# Patient Record
Sex: FEMALE | Race: WHITE
[De-identification: names, ages, dates, MRNs, and addresses within clinical notes are randomized per-mention and may not be internally consistent; named-entity substitution may affect disease eponyms.]

---

## 2021-12-13 ENCOUNTER — HOSPITAL ENCOUNTER (EMERGENCY)
Dept: HOSPITAL 33 - ED | Age: 56
Discharge: HOME | End: 2021-12-13
Payer: MEDICAID

## 2021-12-13 VITALS — DIASTOLIC BLOOD PRESSURE: 80 MMHG | HEART RATE: 83 BPM | SYSTOLIC BLOOD PRESSURE: 138 MMHG

## 2021-12-13 VITALS — OXYGEN SATURATION: 100 %

## 2021-12-13 DIAGNOSIS — Z72.0: ICD-10-CM

## 2021-12-13 DIAGNOSIS — N18.9: ICD-10-CM

## 2021-12-13 DIAGNOSIS — I71.4: Primary | ICD-10-CM

## 2021-12-13 DIAGNOSIS — I12.9: ICD-10-CM

## 2021-12-13 DIAGNOSIS — E11.22: ICD-10-CM

## 2021-12-13 DIAGNOSIS — R11.2: ICD-10-CM

## 2021-12-13 DIAGNOSIS — Z79.84: ICD-10-CM

## 2021-12-13 LAB
ALBUMIN SERPL-MCNC: 4.5 G/DL (ref 3.5–5)
ALP SERPL-CCNC: 83 U/L (ref 38–126)
ALT SERPL-CCNC: 13 U/L (ref 0–35)
ANION GAP SERPL CALC-SCNC: 18.9 MEQ/L (ref 5–15)
AST SERPL QL: 19 U/L (ref 14–36)
BASOPHILS # BLD AUTO: 0.03 10*3/UL (ref 0–0.4)
BASOPHILS NFR BLD AUTO: 0.2 % (ref 0–0.4)
BILIRUB BLD-MCNC: 0.5 MG/DL (ref 0.2–1.3)
BUN SERPL-MCNC: 35 MG/DL (ref 7–17)
CALCIUM SPEC-MCNC: 9.5 MG/DL (ref 8.4–10.2)
CHLORIDE SERPL-SCNC: 96 MMOL/L (ref 98–107)
CO2 SERPL-SCNC: 23 MMOL/L (ref 22–30)
CREAT SERPL-MCNC: 1.31 MG/DL (ref 0.52–1.04)
EOSINOPHIL # BLD AUTO: 0.08 10*3/UL (ref 0–0.5)
GFR SERPLBLD BASED ON 1.73 SQ M-ARVRAT: 44.3 ML/MIN
GLUCOSE SERPL-MCNC: 192 MG/DL (ref 74–106)
GLUCOSE UR-MCNC: >=500 MG/DL
HCT VFR BLD AUTO: 39.6 % (ref 35–47)
HGB BLD-MCNC: 13.4 GM/DL (ref 12–16)
LYMPHOCYTES # SPEC AUTO: 3.64 10*3/UL (ref 1–4.6)
MCH RBC QN AUTO: 28.3 PG (ref 26–32)
MCHC RBC AUTO-ENTMCNC: 33.8 G/DL (ref 32–36)
MONOCYTES # BLD AUTO: 0.81 10*3/UL (ref 0–1.3)
PLATELET # BLD AUTO: 282 K/MM3 (ref 150–450)
POTASSIUM SERPLBLD-SCNC: 4.4 MMOL/L (ref 3.5–5.1)
PROT SERPL-MCNC: 7.7 G/DL (ref 6.3–8.2)
PROT UR STRIP-MCNC: 100 MG/DL
RBC # BLD AUTO: 4.74 M/MM3 (ref 4.1–5.4)
RBC #/AREA URNS HPF: (no result) /HPF (ref 0–2)
SODIUM SERPL-SCNC: 133 MMOL/L (ref 137–145)
WBC # BLD AUTO: 12.3 K/MM3 (ref 4–10.5)
WBC #/AREA URNS HPF: (no result) /HPF (ref 0–5)

## 2021-12-13 PROCEDURE — 81001 URINALYSIS AUTO W/SCOPE: CPT

## 2021-12-13 PROCEDURE — 74176 CT ABD & PELVIS W/O CONTRAST: CPT

## 2021-12-13 PROCEDURE — 99284 EMERGENCY DEPT VISIT MOD MDM: CPT

## 2021-12-13 PROCEDURE — 83605 ASSAY OF LACTIC ACID: CPT

## 2021-12-13 PROCEDURE — 80053 COMPREHEN METABOLIC PANEL: CPT

## 2021-12-13 PROCEDURE — 84484 ASSAY OF TROPONIN QUANT: CPT

## 2021-12-13 PROCEDURE — 85025 COMPLETE CBC W/AUTO DIFF WBC: CPT

## 2021-12-13 PROCEDURE — U0003 INFECTIOUS AGENT DETECTION BY NUCLEIC ACID (DNA OR RNA); SEVERE ACUTE RESPIRATORY SYNDROME CORONAVIRUS 2 (SARS-COV-2) (CORONAVIRUS DISEASE [COVID-19]), AMPLIFIED PROBE TECHNIQUE, MAKING USE OF HIGH THROUGHPUT TECHNOLOGIES AS DESCRIBED BY CMS-2020-01-R: HCPCS

## 2021-12-13 PROCEDURE — 96374 THER/PROPH/DIAG INJ IV PUSH: CPT

## 2021-12-13 PROCEDURE — 96375 TX/PRO/DX INJ NEW DRUG ADDON: CPT

## 2021-12-13 PROCEDURE — 36000 PLACE NEEDLE IN VEIN: CPT

## 2021-12-13 PROCEDURE — 96360 HYDRATION IV INFUSION INIT: CPT

## 2021-12-13 PROCEDURE — 36415 COLL VENOUS BLD VENIPUNCTURE: CPT

## 2021-12-13 PROCEDURE — 93005 ELECTROCARDIOGRAM TRACING: CPT

## 2021-12-13 NOTE — XRAY
Indication: Right back pain.  History kidney stones.



Multiple contiguous axial images obtained through the abdomen and pelvis

without contrast.



Comparison: None



Lung bases are clear.  Heart is not enlarged.



No renal calculus or evidence for obstructive uropathy in either  system.

There are mild scattered renal artery calcifications right greater than left.

Noncontrasted stomach and bowel loops nonobstructed.  Normal appendix.

Radiopacity in the ascending and transverse colon presumed ingested

medication/bismuth or barium.  Previous cholecystectomy.  No free fluid/air.



Remaining liver, pancreas, spleen, adrenal glands, kidneys, ureters, and

bladder are unremarkable for noncontrast exam.  Moderate aortoiliac

calcifications with distal 2.3 x 2.5 cm aortic aneurysm.



Osseous structures intact with minimal/mild degenerative changes throughout

the spine.  Intact ventral mesh graft.



Impression:

1.  No renal calculus or evidence for obstructive uropathy.

2.  Moderate arteriosclerotic calcifications including both renal arteries.

Incidental minimal distal AAA.

3.  Remaining CT abdomen/pelvis without contrast exam is negative.

## 2021-12-13 NOTE — ERPHSYRPT
- History of Present Illness


Historian: patient


Exam Limitations: no limitations


Patient Subjective Stated Complaint: PT states "I have been vomiting since 

friday and the right side of my lower back hurts.".  Pt sister states "She has 

chronic UTI, she just had her left caritid cleaned out and she needs her right 

carotid and both femorals done as well.  We just moved her here from [place].  She is

diabetic, has abdomen issues as well."


Triage Nursing Assessment: Pt presented alert and oriented X 3, skin wpd Pt 

ambulates with an upright steady gait, able to speak in clear full sentences pt 

in no apparent respiratory distress.


Physician History: 





55 yo wf w N/V x 3 days. Pt has mild diarrhea but denies any real abdominal 

pain. Chest pain/dyspnea are also denied. Pt has a mild cough which she states 

is chronic. Hematemesis/melena/hematochezia are also denied. Pt has mild R flank

pain and mild dysuria wo hematuria/frequency. Pt is fully vaccinated and fever 

is denied. Pt smokes 1ppd and ETOH use denied.


Timing/Duration: other (3 days)


Quality: other (No real abdominal pain)


Pain Radiation: no radiation


Severity of Pain-Max: none


Severity of Pain-Current: none


Modifying Factors: Improves With: nothing, urinating


Associated Symptoms: back, diarrhea (Mild), nausea, vomiting


Previous symptoms: no prior history


Allergies/Adverse Reactions: 








Cephalosporins Allergy (Intermediate, Verified 12/13/21 10:24)


   


Tetracyclines Allergy (Intermediate, Verified 12/13/21 10:24)


   


avalox Allergy (Intermediate, Uncoded 12/13/21 10:24)


   





Home Medications: 








Atorvastatin Calcium [Lipitor 40Mg] 40 mg PO DAILY 12/13/21 [History]


Gabapentin [Neurontin] 300 mg PO TID 12/13/21 [History]


Iron 18 mg PO DAILY 12/13/21 [History]


Lisinopril 5 mg*** [Zestril 5 MG***] 5 mg PO DAILY 12/13/21 [History]


Metformin HCl [Metformin ER Gastric] 500 mg PO BID 12/13/21 [History]





Hx Tetanus, Diphtheria Vaccination/Date Given: No


Hx Influenza Vaccination/Date Given: Yes


Hx Pneumococcal Vaccination/Date Given: Yes


Immunizations Up to Date: Yes





Travel Risk





- International Travel


Have you traveled outside of the country in past 3 weeks: No





- Coronavirus Screening


Are you exhibiting any of the following symptoms?: No


Close contact with a COVID-19 positive Pt in past 14-21 Days: No





- Vaccine Status


Have you recieved a Covid-19 vaccination: Yes


: Moderna





- Vaccination Dates


Date of 2cond Vaccination (if applicable): 10/2021





- Review of Systems


Constitutional: No Symptoms


Eyes: No Symptoms


Ears, Nose, & Throat: No Symptoms


Respiratory: No Symptoms


Cardiac: No Symptoms


Abdominal/Gastrointestinal: No Symptoms, Nausea, Vomiting, Diarrhea, No 

Abdominal Pain


Genitourinary Symptoms: No Symptoms, Dysuria (Mild), Flank Pain


Musculoskeletal: No Symptoms


Skin: No Symptoms


Neurological: No Symptoms


Psychological: No Symptoms


Endocrine: No Symptoms


Hematologic/Lymphatic: No Symptoms


Immunological/Allergic: No Symptoms





- Past Medical History


Pertinent Past Medical History: Yes


Neurological History: Dementia


ENT History: No Pertinent History


Cardiac History: Hypertension


Respiratory History: COPD


Endocrine Medical History: Diabetes Type II


Musculoskeletal History: Arthritis


GI Medical History: GERD, Other


 History: No Pertinent History


Psycho-Social History: No Pertinent History


Female Reproductive Disorders: No Pertinent History


Other Medical History: metabolic encephalopathy.  frequent UTI





- Past Surgical History


Past Surgical History: Yes


Other Surgical History: carotid.  back.  torrey.  hysterectomy





- Social History


Smoking Status: Current every day smoker


How long have you smoked: years


Exposure to second hand smoke: Yes


Drug Use: none


Patient Lives Alone: No





- Female History


Hx Pregnant Now: No





- Nursing Vital Signs


Nursing Vital Signs: 


                               Initial Vital Signs











Temperature  97.8 F   12/13/21 10:15


 


Pulse Rate  101 H  12/13/21 10:15


 


Respiratory Rate  20   12/13/21 10:15


 


Blood Pressure  136/86   12/13/21 10:15


 


O2 Sat by Pulse Oximetry  100   12/13/21 10:15








                                   Pain Scale











Pain Intensity                 0











Tachy





- Physical Exam


General Appearance: no apparent distress


Eye Exam: PERRL/EOMI, eyes nml inspection


Ears, Nose, Throat Exam: normal ENT inspection, TMs normal, pharynx normal, 

moist mucous membranes


Neck Exam: normal inspection, non-tender, supple, full range of motion, No 

meningismus, No mass, No Brudzinski, No Kernig's, No carotid bruit


Respiratory Exam: normal breath sounds, lungs clear, airway intact


Cardiovascular Exam: tachycardia (Mild)


Gastrointestinal/Abdomen Exam: soft, normal bowel sounds, No tenderness, No 

distention


Extremity Exam: normal inspection, normal range of motion


Neurologic Exam: alert, oriented x 3, cooperative, CNs II-XII nml as tested, 

normal mood/affect, nml cerebellar function, nml station & gait, sensation nml


Skin Exam: normal color, warm, dry


Lymphatic Exam: No adenopathy


**SpO2 Interpretation**: normal


SpO2: 100


O2 Delivery: Room Air





- Course


Nursing assessment & vital signs reviewed: Yes


EKG Interpreted by Me: RATE (NSR/R91/Normal Qt-QTc/Low voltage/No acute ST 

segment changes)





- CT Exams


  ** Abdomen/Pelvis


CT Interpretation: Discussed w/radiologist (Nothing acute/2.3x2.5AAA/Normal 

appy/Previous torrey)


Ordered Tests: 


                               Active Orders 24 hr











 Category Date Time Status


 


 EKG-ER Only STAT Care  12/13/21 10:33 Completed


 


 IV Insertion STAT Care  12/13/21 10:33 Completed


 


 ABDOMEN AND PELVIS W/0 CONTRAS [CT] Stat Exams  12/13/21 11:36 Completed


 


 CBC W DIFF Stat Lab  12/13/21 10:45 Completed


 


 CMP Stat Lab  12/13/21 10:45 Completed


 


 Lactic Acid Stat Lab  12/13/21 10:33 Completed


 


 TROPONIN Q3H Lab  12/13/21 10:45 Completed


 


 UA W/RFX UR CULTURE Stat Lab  12/13/21 10:29 Completed








Medication Summary














Discontinued Medications














Generic Name Dose Route Start Last Admin





  Trade Name Freq  PRN Reason Stop Dose Admin


 


Fentanyl Citrate  25 mcg  12/13/21 11:48  12/13/21 11:52





  Fentanyl Citrate 100 Mcg/2 Ml* Vial  IV  12/13/21 11:49  25 mcg





  STAT ONE   Administration


 


Fentanyl Citrate  Confirm  12/13/21 11:51 





  Fentanyl Citrate 100 Mcg/2 Ml* Vial  Administered  12/13/21 11:52 





  Dose  





  100 mcg  





  .ROUTE  





  .STK-MED ONE  


 


Sodium Chloride  1,000 mls @ 999 mls/hr  12/13/21 10:33  12/13/21 11:47





  Sodium Chloride 0.9% 1000 Ml  IV  12/13/21 11:33  Infused





  .Q1H1M STA   Infusion


 


Sodium Chloride  Confirm  12/13/21 10:36 





  Sodium Chloride 0.9% 1000 Ml  Administered  12/13/21 10:37 





  Dose  





  1,000 mls @ ud  





  .ROUTE  





  .STK-MED ONE  


 


Ondansetron HCl  4 mg  12/13/21 10:33  12/13/21 10:38





  Ondansetron Hcl 4 Mg/2 Ml Vial  IV  12/13/21 10:34  4 mg





  STAT ONE   Administration


 


Ondansetron HCl  Confirm  12/13/21 10:36 





  Ondansetron Hcl 4 Mg/2 Ml Vial  Administered  12/13/21 10:37 





  Dose  





  4 mg  





  .ROUTE  





  .STK-MED ONE  











Lab/Rad Data: 


                           Laboratory Result Diagrams





                                 12/13/21 10:45 





                                 12/13/21 10:45 





                               Laboratory Results











  12/13/21 12/13/21 12/13/21 Range/Units





  10:45 10:45 10:45 


 


WBC    12.3 H  (4.0-10.5)  K/mm3


 


RBC    4.74  (4.1-5.4)  M/mm3


 


Hgb    13.4  (12.0-16.0)  gm/dl


 


Hct    39.6  (35-47)  %


 


MCV    83.5  ()  fl


 


MCH    28.3  (26-32)  pg


 


MCHC    33.8  (32-36)  g/dl


 


RDW    14.9 H  (11.5-14.0)  %


 


Plt Count    282  (150-450)  K/mm3


 


MPV    9.3  (7.5-11.0)  fl


 


Gran %    63.0  (36.0-66.0)  %


 


Eos # (Auto)    0.08  (0-0.5)  


 


Absolute Lymphs (auto)    3.64  (1.0-4.6)  


 


Absolute Monos (auto)    0.81  (0.0-1.3)  


 


Lymphocytes %    29.6  (24.0-44.0)  %


 


Monocytes %    6.6  (0.0-12.0)  %


 


Eosinophils %    0.6  (0.00-5.0)  %


 


Basophils %    0.2  (0.0-0.4)  %


 


Absolute Granulocytes    7.75 H  (1.4-6.9)  


 


Basophils #    0.03  (0-0.4)  


 


Sodium   133 L   (137-145)  mmol/L


 


Potassium   4.4   (3.5-5.1)  mmol/L


 


Chloride   96 L   ()  mmol/L


 


Carbon Dioxide   23   (22-30)  mmol/L


 


Anion Gap   18.9 H   (5-15)  MEQ/L


 


BUN   35 H   (7-17)  mg/dL


 


Creatinine   1.31 H   (0.52-1.04)  mg/dL


 


Estimated GFR   44.3   ML/MIN


 


Glucose   192 H   ()  mg/dL


 


Lactic Acid     (0.4-2.0)  


 


Calcium   9.5   (8.4-10.2)  mg/dL


 


Total Bilirubin   0.50   (0.2-1.3)  mg/dL


 


AST   19   (14-36)  U/L


 


ALT   13   (0-35)  U/L


 


Alkaline Phosphatase   83   ()  U/L


 


Troponin I  < 0.012    (0.000-0.034)  ng/mL


 


Serum Total Protein   7.7   (6.3-8.2)  g/dL


 


Albumin   4.5   (3.5-5.0)  g/dL


 


Urine Color     (YELLOW)  


 


Urine Appearance     (CLEAR)  


 


Urine pH     (5-6)  


 


Ur Specific Gravity     (1.005-1.025)  


 


Urine Protein     (Negative)  


 


Urine Ketones     (NEGATIVE)  


 


Urine Blood     (0-5)  Boyd/ul


 


Urine Nitrite     (NEGATIVE)  


 


Urine Bilirubin     (NEGATIVE)  


 


Urine Urobilinogen     (0-1)  mg/dL


 


Ur Leukocyte Esterase     (NEGATIVE)  


 


Urine WBC (Auto)     (0-5)  /HPF


 


Urine RBC (Auto)     (0-2)  /HPF


 


U Epithel Cells (Auto)     (FEW)  /HPF


 


Urine Bacteria (Auto)     (NEGATIVE)  /HPF


 


Urine Mucus (Auto)     (NEGATIVE)  /HPF


 


Urine Culture Reflexed     (NO)  


 


Urine Glucose     (NEGATIVE)  mg/dL














  12/13/21 12/13/21 Range/Units





  10:33 10:29 


 


WBC    (4.0-10.5)  K/mm3


 


RBC    (4.1-5.4)  M/mm3


 


Hgb    (12.0-16.0)  gm/dl


 


Hct    (35-47)  %


 


MCV    ()  fl


 


MCH    (26-32)  pg


 


MCHC    (32-36)  g/dl


 


RDW    (11.5-14.0)  %


 


Plt Count    (150-450)  K/mm3


 


MPV    (7.5-11.0)  fl


 


Gran %    (36.0-66.0)  %


 


Eos # (Auto)    (0-0.5)  


 


Absolute Lymphs (auto)    (1.0-4.6)  


 


Absolute Monos (auto)    (0.0-1.3)  


 


Lymphocytes %    (24.0-44.0)  %


 


Monocytes %    (0.0-12.0)  %


 


Eosinophils %    (0.00-5.0)  %


 


Basophils %    (0.0-0.4)  %


 


Absolute Granulocytes    (1.4-6.9)  


 


Basophils #    (0-0.4)  


 


Sodium    (137-145)  mmol/L


 


Potassium    (3.5-5.1)  mmol/L


 


Chloride    ()  mmol/L


 


Carbon Dioxide    (22-30)  mmol/L


 


Anion Gap    (5-15)  MEQ/L


 


BUN    (7-17)  mg/dL


 


Creatinine    (0.52-1.04)  mg/dL


 


Estimated GFR    ML/MIN


 


Glucose    ()  mg/dL


 


Lactic Acid  1.2   (0.4-2.0)  


 


Calcium    (8.4-10.2)  mg/dL


 


Total Bilirubin    (0.2-1.3)  mg/dL


 


AST    (14-36)  U/L


 


ALT    (0-35)  U/L


 


Alkaline Phosphatase    ()  U/L


 


Troponin I    (0.000-0.034)  ng/mL


 


Serum Total Protein    (6.3-8.2)  g/dL


 


Albumin    (3.5-5.0)  g/dL


 


Urine Color   YELLOW  (YELLOW)  


 


Urine Appearance   SLIGHTLY CLOUDY  (CLEAR)  


 


Urine pH   5.0  (5-6)  


 


Ur Specific Gravity   1.018  (1.005-1.025)  


 


Urine Protein   100  (Negative)  


 


Urine Ketones   TRACE  (NEGATIVE)  


 


Urine Blood   NEGATIVE  (0-5)  Boyd/ul


 


Urine Nitrite   NEGATIVE  (NEGATIVE)  


 


Urine Bilirubin   NEGATIVE  (NEGATIVE)  


 


Urine Urobilinogen   NEGATIVE  (0-1)  mg/dL


 


Ur Leukocyte Esterase   NEGATIVE  (NEGATIVE)  


 


Urine WBC (Auto)   NONE  (0-5)  /HPF


 


Urine RBC (Auto)   NONE  (0-2)  /HPF


 


U Epithel Cells (Auto)   RARE  (FEW)  /HPF


 


Urine Bacteria (Auto)   NONE  (NEGATIVE)  /HPF


 


Urine Mucus (Auto)   SLIGHT  (NEGATIVE)  /HPF


 


Urine Culture Reflexed   NO  (NO)  


 


Urine Glucose   >=500  (NEGATIVE)  mg/dL














- Progress


Progress: improved


Progress Note: 





12/13/21 12:42


1L NS bolus/4mg IV Zofran


25umg IV Fentanyl


12/13/21 12:43


Pt has renal insufficiency which pt states is chronic. All previous records in [facility].


All CT results reviewed w pt


12/13/21 19:53


Covid19 test pending


Counseled pt/family regarding: lab results, diagnosis, need for follow-up, rad 

results





- Departure


Departure Disposition: Home


Clinical Impression: 


 Nausea & vomiting, AAA (abdominal aortic aneurysm) without rupture, Renal 

insufficiency





Condition: Stable


Critical Care Time: No


Referrals: 


DOCTOR,NO FAMILY [Primary Care Provider] - Follow up/PCP as directed


Instructions:  Nausea and Vomiting, Adult (DC), Chronic Kidney Disease (DC)


Additional Instructions: 


Zofran for nausea/vomiting


Fluids


Advance diet slowly


Return to ER for inability to hold down fluids or increasing abdominal pain


Follow up with a family MD about aortic aneurism





Prescriptions: 


ondansetron HCL [Zofran***] 4 mg PO Q6H PRN #10 tablet


 PRN Reason: Nausea/Vomiting

## 2021-12-19 ENCOUNTER — HOSPITAL ENCOUNTER (OUTPATIENT)
Dept: HOSPITAL 33 - ED | Age: 56
Setting detail: OBSERVATION
LOS: 4 days | Discharge: HOME | End: 2021-12-23
Attending: FAMILY MEDICINE | Admitting: FAMILY MEDICINE
Payer: MEDICAID

## 2021-12-19 DIAGNOSIS — Z98.890: ICD-10-CM

## 2021-12-19 DIAGNOSIS — R19.7: ICD-10-CM

## 2021-12-19 DIAGNOSIS — F17.200: ICD-10-CM

## 2021-12-19 DIAGNOSIS — I10: ICD-10-CM

## 2021-12-19 DIAGNOSIS — R41.82: ICD-10-CM

## 2021-12-19 DIAGNOSIS — R10.9: ICD-10-CM

## 2021-12-19 DIAGNOSIS — Z20.828: ICD-10-CM

## 2021-12-19 DIAGNOSIS — I65.23: ICD-10-CM

## 2021-12-19 DIAGNOSIS — Z79.899: ICD-10-CM

## 2021-12-19 DIAGNOSIS — E87.1: ICD-10-CM

## 2021-12-19 DIAGNOSIS — E11.9: ICD-10-CM

## 2021-12-19 DIAGNOSIS — J44.9: ICD-10-CM

## 2021-12-19 DIAGNOSIS — A04.72: Primary | ICD-10-CM

## 2021-12-19 LAB
ALBUMIN SERPL-MCNC: 3.6 G/DL (ref 3.5–5)
ALP SERPL-CCNC: 93 U/L (ref 38–126)
ALT SERPL-CCNC: 18 U/L (ref 0–35)
AMPHETAMINES UR QL: NEGATIVE
ANION GAP SERPL CALC-SCNC: 14.4 MEQ/L (ref 5–15)
AST SERPL QL: 24 U/L (ref 14–36)
BARBITURATES UR QL: NEGATIVE
BASOPHILS # BLD AUTO: 0.04 10*3/UL (ref 0–0.4)
BASOPHILS NFR BLD AUTO: 0.3 % (ref 0–0.4)
BENZODIAZ UR QL SCN: POSITIVE
BILIRUB BLD-MCNC: 0.3 MG/DL (ref 0.2–1.3)
BUN SERPL-MCNC: 22 MG/DL (ref 7–17)
CALCIUM SPEC-MCNC: 8.6 MG/DL (ref 8.4–10.2)
CHLORIDE SERPL-SCNC: 101 MMOL/L (ref 98–107)
CO2 SERPL-SCNC: 18 MMOL/L (ref 22–30)
COCAINE UR QL SCN: NEGATIVE
CREAT SERPL-MCNC: 0.9 MG/DL (ref 0.52–1.04)
EOSINOPHIL # BLD AUTO: 0.15 10*3/UL (ref 0–0.5)
FLUAV AG NPH QL IA: NEGATIVE
FLUBV AG NPH QL IA: NEGATIVE
GFR SERPLBLD BASED ON 1.73 SQ M-ARVRAT: > 60 ML/MIN
GLUCOSE SERPL-MCNC: 278 MG/DL (ref 74–106)
GLUCOSE UR-MCNC: >=500 MG/DL
HCT VFR BLD AUTO: 41.6 % (ref 35–47)
HGB BLD-MCNC: 14.4 GM/DL (ref 12–16)
LYMPHOCYTES # SPEC AUTO: 3.77 10*3/UL (ref 1–4.6)
MCH RBC QN AUTO: 28.4 PG (ref 26–32)
MCHC RBC AUTO-ENTMCNC: 34.6 G/DL (ref 32–36)
METHADONE UR QL: NEGATIVE
MONOCYTES # BLD AUTO: 0.94 10*3/UL (ref 0–1.3)
OPIATES UR QL: POSITIVE
PCP UR QL CFM>20 NG/ML: NEGATIVE
PLATELET # BLD AUTO: 319 K/MM3 (ref 150–450)
POTASSIUM SERPLBLD-SCNC: 4.3 MMOL/L (ref 3.5–5.1)
PROT SERPL-MCNC: 6.6 G/DL (ref 6.3–8.2)
PROT UR STRIP-MCNC: 100 MG/DL
RBC # BLD AUTO: 5.07 M/MM3 (ref 4.1–5.4)
RBC #/AREA URNS HPF: (no result) /HPF (ref 0–2)
RSV AG SPEC QL IA: NEGATIVE
SARS-COV-2 AG RESP QL IA.RAPID: NEGATIVE
SODIUM SERPL-SCNC: 129 MMOL/L (ref 137–145)
THC UR QL SCN: POSITIVE
WBC # BLD AUTO: 14.3 K/MM3 (ref 4–10.5)
WBC #/AREA URNS HPF: (no result) /HPF (ref 0–5)

## 2021-12-19 PROCEDURE — 83605 ASSAY OF LACTIC ACID: CPT

## 2021-12-19 PROCEDURE — 97530 THERAPEUTIC ACTIVITIES: CPT

## 2021-12-19 PROCEDURE — 84443 ASSAY THYROID STIM HORMONE: CPT

## 2021-12-19 PROCEDURE — 85025 COMPLETE CBC W/AUTO DIFF WBC: CPT

## 2021-12-19 PROCEDURE — 70450 CT HEAD/BRAIN W/O DYE: CPT

## 2021-12-19 PROCEDURE — 83690 ASSAY OF LIPASE: CPT

## 2021-12-19 PROCEDURE — 82947 ASSAY GLUCOSE BLOOD QUANT: CPT

## 2021-12-19 PROCEDURE — 95812 EEG 41-60 MINUTES: CPT

## 2021-12-19 PROCEDURE — 86140 C-REACTIVE PROTEIN: CPT

## 2021-12-19 PROCEDURE — 36415 COLL VENOUS BLD VENIPUNCTURE: CPT

## 2021-12-19 PROCEDURE — 85730 THROMBOPLASTIN TIME PARTIAL: CPT

## 2021-12-19 PROCEDURE — 80048 BASIC METABOLIC PNL TOTAL CA: CPT

## 2021-12-19 PROCEDURE — 80053 COMPREHEN METABOLIC PANEL: CPT

## 2021-12-19 PROCEDURE — 97161 PT EVAL LOW COMPLEX 20 MIN: CPT

## 2021-12-19 PROCEDURE — 81001 URINALYSIS AUTO W/SCOPE: CPT

## 2021-12-19 PROCEDURE — 80307 DRUG TEST PRSMV CHEM ANLYZR: CPT

## 2021-12-19 PROCEDURE — 94760 N-INVAS EAR/PLS OXIMETRY 1: CPT

## 2021-12-19 PROCEDURE — 87493 C DIFF AMPLIFIED PROBE: CPT

## 2021-12-19 PROCEDURE — 84484 ASSAY OF TROPONIN QUANT: CPT

## 2021-12-19 PROCEDURE — 83036 HEMOGLOBIN GLYCOSYLATED A1C: CPT

## 2021-12-19 PROCEDURE — 96374 THER/PROPH/DIAG INJ IV PUSH: CPT

## 2021-12-19 PROCEDURE — 86592 SYPHILIS TEST NON-TREP QUAL: CPT

## 2021-12-19 PROCEDURE — 82607 VITAMIN B-12: CPT

## 2021-12-19 PROCEDURE — 82140 ASSAY OF AMMONIA: CPT

## 2021-12-19 PROCEDURE — 70544 MR ANGIOGRAPHY HEAD W/O DYE: CPT

## 2021-12-19 PROCEDURE — 87086 URINE CULTURE/COLONY COUNT: CPT

## 2021-12-19 PROCEDURE — 71045 X-RAY EXAM CHEST 1 VIEW: CPT

## 2021-12-19 PROCEDURE — 70551 MRI BRAIN STEM W/O DYE: CPT

## 2021-12-19 PROCEDURE — G0378 HOSPITAL OBSERVATION PER HR: HCPCS

## 2021-12-19 PROCEDURE — G0480 DRUG TEST DEF 1-7 CLASSES: HCPCS

## 2021-12-19 PROCEDURE — 36000 PLACE NEEDLE IN VEIN: CPT

## 2021-12-19 PROCEDURE — 0241U: CPT

## 2021-12-19 PROCEDURE — 84145 PROCALCITONIN (PCT): CPT

## 2021-12-19 PROCEDURE — 97110 THERAPEUTIC EXERCISES: CPT

## 2021-12-19 PROCEDURE — 87040 BLOOD CULTURE FOR BACTERIA: CPT

## 2021-12-19 PROCEDURE — 99285 EMERGENCY DEPT VISIT HI MDM: CPT

## 2021-12-19 PROCEDURE — 83735 ASSAY OF MAGNESIUM: CPT

## 2021-12-19 PROCEDURE — 70547 MR ANGIOGRAPHY NECK W/O DYE: CPT

## 2021-12-19 PROCEDURE — 83921 ORGANIC ACID SINGLE QUANT: CPT

## 2021-12-19 PROCEDURE — 93005 ELECTROCARDIOGRAM TRACING: CPT

## 2021-12-19 NOTE — ERPHSYRPT
- History of Present Illness


Time Seen by Provider: 12/19/21 17:45


Source: family


Exam Limitations: clinical condition


Patient Subjective Stated Complaint: Sister states that the pt has become very 

confused and doesn't recognize anyone in the family and thought that she was 

still in [place], she moved here about a 1.5 weeks ago


Triage Nursing Assessment: Pt brought to the ER by her sister, hypertensive, 

rates pain 10/10, unable to hold either leg up, unable to touch finger to nose, 

unable to find this nurses finger to touch, no peripherial vision, pulses 

normal, diagnosed Friday with a UTI and is on antibiotics, has been septic 

before, hx of TIA's, has a droop on her face but sister says that is normal when

she is tired, skin n/w/d, doesn't recognize any family members, doesn't know her

name, last known well was yesterday afternoon


Physician History: 





Patient is a 56-year-old white female who presents with onset of confusion.  

Which started last night.  She recently was brought to this area by her family c

oncerned about her overall health.  She has been in the ER recently for urinary 

tract infection and is on antibiotics.  She has been septic x2 in the past and 

has also had a CVA.  Today she was confused and thought she was still in [place] 

[place].


Timing/Duration: yesterday


Severity: severe


Deficits: cannot stand, cannot walk


Baseline/Normal Cognition: alert oriented x 3


Current Cognition: alert/disoriented to time


Baseline Gait: unable to walk


Allergies/Adverse Reactions: 








Cephalosporins Allergy (Intermediate, Verified 12/19/21 18:10)


   


Tetracyclines Allergy (Intermediate, Verified 12/19/21 18:10)


   


avalox Allergy (Intermediate, Uncoded 12/19/21 18:10)


   





Home Medications: 








Atorvastatin Calcium [Lipitor 40Mg] 40 mg PO DAILY 12/13/21 [History]


Gabapentin [Neurontin] 300 mg PO TID 12/13/21 [History]


Iron 18 mg PO DAILY 12/13/21 [History]


Lisinopril 5 mg*** [Zestril 5 MG***] 5 mg PO DAILY 12/13/21 [History]


Metformin HCl [Metformin ER Gastric] 500 mg PO BID 12/13/21 [History]





Hx Tetanus, Diphtheria Vaccination/Date Given: No


Hx Influenza Vaccination/Date Given: Yes


Hx Pneumococcal Vaccination/Date Given: Yes





Travel Risk





- International Travel


Have you traveled outside of the country in past 3 weeks: No





- Coronavirus Screening


Are you exhibiting any of the following symptoms?: No


Close contact with a COVID-19 positive Pt in past 14-21 Days: No





- Vaccine Status


Have you recieved a Covid-19 vaccination: Yes


: Moderna





- Vaccination Dates


Date of 2cond Vaccination (if applicable): 10/2021





- Review of Systems


All Other Systems: Unable due to condition





- Past Medical History


Pertinent Past Medical History: Yes


Neurological History: Dementia, TIA


ENT History: No Pertinent History


Cardiac History: Hypertension


Respiratory History: COPD


Endocrine Medical History: Diabetes Type II


Musculoskeletal History: Arthritis


GI Medical History: GERD, Other


 History: No Pertinent History


Psycho-Social History: No Pertinent History


Female Reproductive Disorders: No Pertinent History


Other Medical History: metabolic encephalopathy.  frequent UTI





- Past Surgical History


Past Surgical History: Yes


Other Surgical History: carotid.  back.  torrey.  hysterectomy





- Social History


Smoking Status: Current every day smoker


How long have you smoked: years


Exposure to second hand smoke: Yes


Drug Use: none


Patient Lives Alone: No





- Nursing Vital Signs


Nursing Vital Signs: 


                               Initial Vital Signs











Temperature  97.5 F   12/19/21 17:40


 


Pulse Rate  92 H  12/19/21 17:40


 


Blood Pressure  145/79   12/19/21 17:40


 


O2 Sat by Pulse Oximetry  98   12/19/21 17:40








                                   Pain Scale











Pain Intensity                 5

















- Ana Coma Scale


Best Eye Response (Ana): (4) open spontaneously


Best Verbal Response (Ana): (4) confused conversation


Best Motor Response (Ana): (5) localizes to pain


Patterson Total: 13





- Physical Exam


General Appearance: moderate distress


Eye Exam: bilateral eye: normal inspection, PERRL, EOMI


Ears, Nose, Throat Exam: normal ENT inspection, moist mucous membranes


Neck Exam: normal inspection, non-tender, supple


Respiratory: normal breath sounds, No chest tenderness, No respiratory distress


Cardiovascular: regular rate/rhythm, normal heart sounds


Gastrointestinal: soft, normal bowel sounds, No tenderness, No guarding


Pelvic Exam: not done


Rectal Exam: deferred


Back Exam: normal inspection, normal range of motion


Extremity Exam: normal inspection, normal range of motion


Peripheral Pulses: carotid (R): 2+, carotid (L): 2+


Mental Status: disoriented to person, disoriented to place, disoriented to time


CNs Exam: PERRL, tongue midline, No facial droop


Motor/Sensory: no sensory deficit, weak motor strength RUE, weak motor strength 

LUE, weak motor strength RLE, weak motor strength LLE


Skin Exam: normal color, warm, dry


**SpO2 Interpretation**: normal


SpO2: 96


O2 Delivery: Room Air





- Course


Nursing assessment & vital signs reviewed: Yes


EKG Interpreted by Me: RATE ([id number]), NORMAL AXIS, NORMAL INTERVALS, Non-specific ST

 Changes





- Radiology Exams


  ** Chest


X-ray Interpretation: Interpreted by me, Negative





- CT Exams


  ** Head


CT Interpretation: Tele-radiologist Report


Ordered Tests: 


                               Active Orders 24 hr











 Category Date Time Status


 


 EKG-ER Only STAT Care  12/19/21 17:47 Active


 


 IV Insertion STAT Care  12/19/21 17:47 Active


 


 NPO (ED) STAT Care  12/19/21 17:48 Active


 


 CHEST 1 VIEW (PORTABLE) Stat Exams  12/19/21 17:48 Taken


 


 HEAD WITHOUT CONTRAST [CT] Stat Exams  12/19/21 17:48 Taken


 


 BLOOD CULTURE Stat Lab  12/19/21 18:20 Received


 


 CBC W DIFF Stat Lab  12/19/21 18:04 Completed


 


 CMP Stat Lab  12/19/21 21:04 Ordered


 


 CULTURE,URINE Stat Lab  12/19/21 18:04 Received


 


 ETHYL ALCOHOL Stat Lab  12/19/21 18:04 Completed


 


 Lactic Acid Urgent Lab  12/19/21 17:50 Completed


 


 PROCALCITONIN Stat Lab  12/19/21 18:00 Completed


 


 PTT Stat Lab  12/19/21 18:04 Completed


 


 TROPONIN Q3H Lab  12/19/21 18:04 Completed


 


 TROPONIN Q3H Lab  12/19/21 21:00 Ordered


 


 TROPONIN Q3H Lab  12/20/21 00:00 Ordered


 


 TROPONIN Q3H Lab  12/20/21 03:00 Ordered


 


 TROPONIN Q3H Lab  12/20/21 06:00 Ordered


 


 UA W/RFX UR CULTURE Stat Lab  12/19/21 18:04 Completed


 


 Urine Triage Profile Stat Lab  12/19/21 18:04 Completed








Medication Summary














Discontinued Medications














Generic Name Dose Route Start Last Admin





  Trade Name Freq  PRN Reason Stop Dose Admin


 


Sodium Chloride  1,000 mls @ 999 mls/hr  12/19/21 17:50  12/19/21 19:21





  Sodium Chloride 0.9% 1000 Ml  IV  12/19/21 18:50  Infused





  .Q1H1M STA   Infusion


 


Sodium Chloride  Confirm  12/19/21 18:05 





  Sodium Chloride 0.9% 1000 Ml  Administered  12/19/21 18:06 





  Dose  





  1,000 mls @ ud  





  .ROUTE  





  .[facility]  











Lab/Rad Data: 


                           Laboratory Result Diagrams





                                 12/19/21 18:04 





                               Laboratory Results











  12/19/21 12/19/21 12/19/21 Range/Units





  18:04 18:04 18:04 


 


WBC     (4.0-10.5)  K/mm3


 


RBC     (4.1-5.4)  M/mm3


 


Hgb     (12.0-16.0)  gm/dl


 


Hct     (35-47)  %


 


MCV     ()  fl


 


MCH     (26-32)  pg


 


MCHC     (32-36)  g/dl


 


RDW     (11.5-14.0)  %


 


Plt Count     (150-450)  K/mm3


 


MPV     (7.5-11.0)  fl


 


Gran %     (36.0-66.0)  %


 


Eos # (Auto)     (0-0.5)  


 


Absolute Lymphs (auto)     (1.0-4.6)  


 


Absolute Monos (auto)     (0.0-1.3)  


 


Lymphocytes %     (24.0-44.0)  %


 


Monocytes %     (0.0-12.0)  %


 


Eosinophils %     (0.00-5.0)  %


 


Basophils %     (0.0-0.4)  %


 


Absolute Granulocytes     (1.4-6.9)  


 


Basophils #     (0-0.4)  


 


APTT     (25.1-36.5)  SECONDS


 


Lactic Acid     (0.4-2.0)  


 


Troponin I  < 0.012    (0.000-0.034)  ng/mL


 


Procalcitonin     (0.030-0.080)  ng/mL


 


Urine Color    YELLOW  (YELLOW)  


 


Urine Appearance    SLIGHTLY CLOUDY  (CLEAR)  


 


Urine pH    5.0  (5-6)  


 


Ur Specific Gravity    1.025  (1.005-1.025)  


 


Urine Protein    100  (Negative)  


 


Urine Ketones    NEGATIVE  (NEGATIVE)  


 


Urine Blood    SMALL  (0-5)  Boyd/ul


 


Urine Nitrite    NEGATIVE  (NEGATIVE)  


 


Urine Bilirubin    NEGATIVE  (NEGATIVE)  


 


Urine Urobilinogen    NEGATIVE  (0-1)  mg/dL


 


Ur Leukocyte Esterase    NEGATIVE  (NEGATIVE)  


 


Urine WBC (Auto)    3-5  (0-5)  /HPF


 


Urine RBC (Auto)    NONE  (0-2)  /HPF


 


U Epithel Cells (Auto)    NONE  (FEW)  /HPF


 


Urine Bacteria (Auto)    NONE  (NEGATIVE)  /HPF


 


Urine Mucus (Auto)    SLIGHT  (NEGATIVE)  /HPF


 


Urine Yeast (Budding)    Few  (NEGATIVE)  /HPF


 


Urine Culture Reflexed    YES  (NO)  


 


Urine Glucose    >=500  (NEGATIVE)  mg/dL


 


Urine Opiates Level   POSITIVE   (NEGATIVE)  


 


Ur Methadone   NEGATIVE   (NEGATIVE)  


 


Urine Barbiturates   NEGATIVE   (NEGATIVE)  


 


Ur Phencyclidine (PCP)   NEGATIVE   (NEGATIVE)  


 


Urine Amphetamine   NEGATIVE   (NEGATIVE)  


 


U Benzodiazepine Level   POSITIVE   (NEGATIVE)  


 


Urine Cocaine   NEGATIVE   (NEGATIVE)  


 


Urine Marijuana (THC)   POSITIVE   (NEGATIVE)  


 


Ethyl Alcohol     (0-10)  mg/dL














  12/19/21 12/19/21 12/19/21 Range/Units





  18:04 18:04 18:04 


 


WBC    14.3 H  (4.0-10.5)  K/mm3


 


RBC    5.07  (4.1-5.4)  M/mm3


 


Hgb    14.4  (12.0-16.0)  gm/dl


 


Hct    41.6  (35-47)  %


 


MCV    82.1  ()  fl


 


MCH    28.4  (26-32)  pg


 


MCHC    34.6  (32-36)  g/dl


 


RDW    14.5 H  (11.5-14.0)  %


 


Plt Count    319  (150-450)  K/mm3


 


MPV    10.1  (7.5-11.0)  fl


 


Gran %    65.8  (36.0-66.0)  %


 


Eos # (Auto)    0.15  (0-0.5)  


 


Absolute Lymphs (auto)    3.77  (1.0-4.6)  


 


Absolute Monos (auto)    0.94  (0.0-1.3)  


 


Lymphocytes %    26.3  (24.0-44.0)  %


 


Monocytes %    6.6  (0.0-12.0)  %


 


Eosinophils %    1.0  (0.00-5.0)  %


 


Basophils %    0.3  (0.0-0.4)  %


 


Absolute Granulocytes    9.43 H  (1.4-6.9)  


 


Basophils #    0.04  (0-0.4)  


 


APTT   19.4 L   (25.1-36.5)  SECONDS


 


Lactic Acid     (0.4-2.0)  


 


Troponin I     (0.000-0.034)  ng/mL


 


Procalcitonin     (0.030-0.080)  ng/mL


 


Urine Color     (YELLOW)  


 


Urine Appearance     (CLEAR)  


 


Urine pH     (5-6)  


 


Ur Specific Gravity     (1.005-1.025)  


 


Urine Protein     (Negative)  


 


Urine Ketones     (NEGATIVE)  


 


Urine Blood     (0-5)  Boyd/ul


 


Urine Nitrite     (NEGATIVE)  


 


Urine Bilirubin     (NEGATIVE)  


 


Urine Urobilinogen     (0-1)  mg/dL


 


Ur Leukocyte Esterase     (NEGATIVE)  


 


Urine WBC (Auto)     (0-5)  /HPF


 


Urine RBC (Auto)     (0-2)  /HPF


 


U Epithel Cells (Auto)     (FEW)  /HPF


 


Urine Bacteria (Auto)     (NEGATIVE)  /HPF


 


Urine Mucus (Auto)     (NEGATIVE)  /HPF


 


Urine Yeast (Budding)     (NEGATIVE)  /HPF


 


Urine Culture Reflexed     (NO)  


 


Urine Glucose     (NEGATIVE)  mg/dL


 


Urine Opiates Level     (NEGATIVE)  


 


Ur Methadone     (NEGATIVE)  


 


Urine Barbiturates     (NEGATIVE)  


 


Ur Phencyclidine (PCP)     (NEGATIVE)  


 


Urine Amphetamine     (NEGATIVE)  


 


U Benzodiazepine Level     (NEGATIVE)  


 


Urine Cocaine     (NEGATIVE)  


 


Urine Marijuana (THC)     (NEGATIVE)  


 


Ethyl Alcohol  < 10    (0-10)  mg/dL














  12/19/21 12/19/21 Range/Units





  18:00 17:50 


 


WBC    (4.0-10.5)  K/mm3


 


RBC    (4.1-5.4)  M/mm3


 


Hgb    (12.0-16.0)  gm/dl


 


Hct    (35-47)  %


 


MCV    ()  fl


 


MCH    (26-32)  pg


 


MCHC    (32-36)  g/dl


 


RDW    (11.5-14.0)  %


 


Plt Count    (150-450)  K/mm3


 


MPV    (7.5-11.0)  fl


 


Gran %    (36.0-66.0)  %


 


Eos # (Auto)    (0-0.5)  


 


Absolute Lymphs (auto)    (1.0-4.6)  


 


Absolute Monos (auto)    (0.0-1.3)  


 


Lymphocytes %    (24.0-44.0)  %


 


Monocytes %    (0.0-12.0)  %


 


Eosinophils %    (0.00-5.0)  %


 


Basophils %    (0.0-0.4)  %


 


Absolute Granulocytes    (1.4-6.9)  


 


Basophils #    (0-0.4)  


 


APTT    (25.1-36.5)  SECONDS


 


Lactic Acid   1.0  (0.4-2.0)  


 


Troponin I    (0.000-0.034)  ng/mL


 


Procalcitonin  0.093 H   (0.030-0.080)  ng/mL


 


Urine Color    (YELLOW)  


 


Urine Appearance    (CLEAR)  


 


Urine pH    (5-6)  


 


Ur Specific Gravity    (1.005-1.025)  


 


Urine Protein    (Negative)  


 


Urine Ketones    (NEGATIVE)  


 


Urine Blood    (0-5)  Boyd/ul


 


Urine Nitrite    (NEGATIVE)  


 


Urine Bilirubin    (NEGATIVE)  


 


Urine Urobilinogen    (0-1)  mg/dL


 


Ur Leukocyte Esterase    (NEGATIVE)  


 


Urine WBC (Auto)    (0-5)  /HPF


 


Urine RBC (Auto)    (0-2)  /HPF


 


U Epithel Cells (Auto)    (FEW)  /HPF


 


Urine Bacteria (Auto)    (NEGATIVE)  /HPF


 


Urine Mucus (Auto)    (NEGATIVE)  /HPF


 


Urine Yeast (Budding)    (NEGATIVE)  /HPF


 


Urine Culture Reflexed    (NO)  


 


Urine Glucose    (NEGATIVE)  mg/dL


 


Urine Opiates Level    (NEGATIVE)  


 


Ur Methadone    (NEGATIVE)  


 


Urine Barbiturates    (NEGATIVE)  


 


Ur Phencyclidine (PCP)    (NEGATIVE)  


 


Urine Amphetamine    (NEGATIVE)  


 


U Benzodiazepine Level    (NEGATIVE)  


 


Urine Cocaine    (NEGATIVE)  


 


Urine Marijuana (THC)    (NEGATIVE)  


 


Ethyl Alcohol    (0-10)  mg/dL














- Progress


Progress: unchanged





- Departure


Departure Disposition: Home


Clinical Impression: 


 Altered mental status





Condition: Fair


Critical Care Time: No


Referrals: 


JAMEY COVARRUBIAS [Primary Care Provider] - Follow up/PCP as directed


Instructions:  Delirium (Confusion) (DC)

## 2021-12-20 RX ADMIN — INSULIN LISPRO PRN UNIT: 100 INJECTION, SOLUTION INTRAVENOUS; SUBCUTANEOUS at 17:23

## 2021-12-20 RX ADMIN — HYDROCODONE BITARTRATE AND ACETAMINOPHEN PRN TAB: 5; 325 TABLET ORAL at 11:39

## 2021-12-20 RX ADMIN — ONDANSETRON PRN MG: 2 INJECTION, SOLUTION INTRAMUSCULAR; INTRAVENOUS at 06:30

## 2021-12-20 RX ADMIN — INSULIN LISPRO PRN UNIT: 100 INJECTION, SOLUTION INTRAVENOUS; SUBCUTANEOUS at 11:39

## 2021-12-20 RX ADMIN — ONDANSETRON PRN MG: 2 INJECTION, SOLUTION INTRAMUSCULAR; INTRAVENOUS at 00:53

## 2021-12-20 RX ADMIN — ASPIRIN SCH MG: 81 TABLET, COATED ORAL at 11:38

## 2021-12-20 RX ADMIN — HYDROCHLOROTHIAZIDE SCH MG: 25 TABLET ORAL at 11:39

## 2021-12-20 RX ADMIN — ONDANSETRON PRN MG: 2 INJECTION, SOLUTION INTRAMUSCULAR; INTRAVENOUS at 19:49

## 2021-12-20 RX ADMIN — SIMVASTATIN SCH MG: 20 TABLET, FILM COATED ORAL at 23:04

## 2021-12-20 RX ADMIN — INSULIN GLARGINE SCH UNIT: 100 INJECTION, SOLUTION SUBCUTANEOUS at 11:40

## 2021-12-20 RX ADMIN — HYDROCODONE BITARTRATE AND ACETAMINOPHEN PRN TAB: 5; 325 TABLET ORAL at 03:30

## 2021-12-20 RX ADMIN — HYDROCODONE BITARTRATE AND ACETAMINOPHEN PRN TAB: 5; 325 TABLET ORAL at 23:03

## 2021-12-20 RX ADMIN — INSULIN GLARGINE SCH UNIT: 100 INJECTION, SOLUTION SUBCUTANEOUS at 23:04

## 2021-12-20 NOTE — XRAY
Indication: Stroke symptoms.



Sagittal, coronal, and axial MRI brain performed without contrast using T1,

T2, FLAIR, diffusion, and ADC sequences.



Comparison: None



Ventriculosulcal pattern appears symmetric.  Minimal periventricular

degenerative micro-ischemia signal bilaterally.  No acute intracranial

hemorrhage, abnormal extra-axial fluid collection, or mass effect.  Diffusion

images are negative for restricted signal.  Fourth ventricle is midline

without hydrocephalus.  7/8 cranial nerve complex bilaterally symmetric.

Normal flow void signal within the major intracerebral circulation.  Normal

appearing craniocervical junction and sella turcica.  Visualized paranasal

sinuses are clear.  Partial fluid signal in both inferior mastoid air cells

presumed inflammatory.



Impression:

1.  Minimal degenerative micro-ischemia within normal limits for patient's age.

2.  No acute intracranial abnormalities or evidence for evolving large vessel

territorial stroke.

3.  Incidental partial fluid signal in both mastoid air cells presumed

inflammatory.

## 2021-12-20 NOTE — XRAY
Indication: Stroke.



Comparison: None



Portable chest demonstrates normal heart and lungs.  Bony thorax intact with

mild osteopenia and degenerative changes.

## 2021-12-20 NOTE — XRAY
Indication: Confusion.  Stroke.



Multiple contiguous axial images obtained through the head without contrast.



Comparison: None



Normal appearing brain parenchyma, ventricles, and bony calvarium for

patient's age.  Visualized paranasal sinuses and mastoid air cells are clear.



Impression: Normal CT head without contrast exam.



Comment: Preliminary interpretation made by [facility].  No critical discrepancy.
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
## 2021-12-20 NOTE — XRAY
Indication: Stroke symptoms.



Multi-slab 3-D time-of-flight MRA [place] performed.



Comparison: None



Distal internal carotid arteries are bilaterally symmetric without critical

stenosis/obstruction.  Normal carotid terminus with normal branching A1 and M1

segments bilaterally.  More distal anterior cerebral and middle cerebral

arteries are normal in MRA appearance bilaterally.



Posterior circulation demonstrates visualized left/right distal vertebral,

basilar, left/right posterior cerebral, and left/right superior cerebellar

arteries to be normal in MRA appearance.



Impression: Negative MRA [place].

## 2021-12-20 NOTE — XRAY
Indication: Stroke symptoms.



Multi-slab 3-D time-of-flight MRA neck performed.



Comparison: None



Study is markedly degraded by motion artifact.  There is moderate

arteriosclerotic disease in the right common carotid artery and mild disease

in the left common carotid artery.  Remaining visualized carotid bulb,

internal carotid, and external carotid arteries appear normal in course and

caliber without critical stenosis/obstruction.



Vertebral arteries are bilaterally symmetric without critical

stenosis/obstruction.



Impression:  Motion artifact limits exam.  Moderate right common carotid and

mild left common carotid arteriosclerotic disease.  Vertebral arteries are

grossly unremarkable in MRA appearance.  Carotid ultrasound or CTA with

contrast exam may yield further information.

## 2021-12-21 LAB
ANION GAP SERPL CALC-SCNC: 8.2 MEQ/L (ref 5–15)
BASOPHILS # BLD AUTO: 0.02 10*3/UL (ref 0–0.4)
BASOPHILS NFR BLD AUTO: 0.2 % (ref 0–0.4)
BUN SERPL-MCNC: 14 MG/DL (ref 7–17)
CALCIUM SPEC-MCNC: 7.8 MG/DL (ref 8.4–10.2)
CHLORIDE SERPL-SCNC: 109 MMOL/L (ref 98–107)
CO2 SERPL-SCNC: 18 MMOL/L (ref 22–30)
CREAT SERPL-MCNC: 0.73 MG/DL (ref 0.52–1.04)
EOSINOPHIL # BLD AUTO: 0.14 10*3/UL (ref 0–0.5)
GFR SERPLBLD BASED ON 1.73 SQ M-ARVRAT: > 60 ML/MIN
GLUCOSE SERPL-MCNC: 102 MG/DL (ref 74–106)
HCT VFR BLD AUTO: 33.8 % (ref 35–47)
HGB BLD-MCNC: 11.4 GM/DL (ref 12–16)
LYMPHOCYTES # SPEC AUTO: 2.95 10*3/UL (ref 1–4.6)
MAGNESIUM SERPL-MCNC: 1.6 MG/DL (ref 1.6–2.3)
MCH RBC QN AUTO: 28.1 PG (ref 26–32)
MCHC RBC AUTO-ENTMCNC: 33.7 G/DL (ref 32–36)
MONOCYTES # BLD AUTO: 0.8 10*3/UL (ref 0–1.3)
PLATELET # BLD AUTO: 232 K/MM3 (ref 150–450)
POTASSIUM SERPLBLD-SCNC: 3.8 MMOL/L (ref 3.5–5.1)
RBC # BLD AUTO: 4.05 M/MM3 (ref 4.1–5.4)
SODIUM SERPL-SCNC: 132 MMOL/L (ref 137–145)
WBC # BLD AUTO: 11.8 K/MM3 (ref 4–10.5)

## 2021-12-21 RX ADMIN — INSULIN LISPRO PRN UNIT: 100 INJECTION, SOLUTION INTRAVENOUS; SUBCUTANEOUS at 17:11

## 2021-12-21 RX ADMIN — ONDANSETRON PRN MG: 2 INJECTION, SOLUTION INTRAMUSCULAR; INTRAVENOUS at 13:11

## 2021-12-21 RX ADMIN — HYDROCHLOROTHIAZIDE SCH MG: 25 TABLET ORAL at 09:34

## 2021-12-21 RX ADMIN — SIMVASTATIN SCH MG: 20 TABLET, FILM COATED ORAL at 22:09

## 2021-12-21 RX ADMIN — ONDANSETRON PRN MG: 2 INJECTION, SOLUTION INTRAMUSCULAR; INTRAVENOUS at 18:06

## 2021-12-21 RX ADMIN — INSULIN GLARGINE SCH: 100 INJECTION, SOLUTION SUBCUTANEOUS at 09:50

## 2021-12-21 RX ADMIN — HYDROCODONE BITARTRATE AND ACETAMINOPHEN PRN TAB: 5; 325 TABLET ORAL at 09:53

## 2021-12-21 RX ADMIN — INSULIN LISPRO PRN UNIT: 100 INJECTION, SOLUTION INTRAVENOUS; SUBCUTANEOUS at 22:09

## 2021-12-21 RX ADMIN — LEVOTHYROXINE SODIUM SCH MCG: 75 TABLET ORAL at 09:34

## 2021-12-21 RX ADMIN — INSULIN LISPRO PRN UNIT: 100 INJECTION, SOLUTION INTRAVENOUS; SUBCUTANEOUS at 15:04

## 2021-12-21 RX ADMIN — HYDROCODONE BITARTRATE AND ACETAMINOPHEN PRN TAB: 5; 325 TABLET ORAL at 22:09

## 2021-12-21 RX ADMIN — HYDROCODONE BITARTRATE AND ACETAMINOPHEN PRN TAB: 5; 325 TABLET ORAL at 16:05

## 2021-12-21 RX ADMIN — INSULIN GLARGINE SCH UNIT: 100 INJECTION, SOLUTION SUBCUTANEOUS at 22:11

## 2021-12-21 RX ADMIN — ASPIRIN SCH MG: 81 TABLET, COATED ORAL at 09:33

## 2021-12-21 RX ADMIN — ONDANSETRON PRN MG: 2 INJECTION, SOLUTION INTRAMUSCULAR; INTRAVENOUS at 22:09

## 2021-12-21 NOTE — PCM.NOTE
Date and Time: 12/21/21  1649





Subjective Assessment: 





Pt having diarrhea and feels weak, although maybe feeling better than at 

admission. 


Stools are liquid.


Pt is oriented to place but thinks the year is 2019.





- Review of Systems


Constitutional: No Fever


Abdominal/Gastrointestinal: Diarrhea





Objective Exam


General Appearance: no apparent distress, alert


Neurologic Exam: cooperative, disoriented, depressed mood/affect


Skin Exam: normal color, warm, dry, No rash


Eye Exam: eyes nml inspection


Ears, Nose, Throat Exam: moist mucous membranes


Neck Exam: normal inspection


Respiratory Exam: normal breath sounds, lungs clear, No crackles/rales, No 

rhonchi, No wheezing


Cardiovascular Exam: regular rate/rhythm, normal heart sounds, No murmur


Gastrointestinal/Abdomen Exam: soft, normal bowel sounds, No tenderness, No 

distention, No mass, No guarding, No rebound


Extremity Exam: normal inspection, No pedal edema, No swelling





OBJECTIVE DATA


Vital Signs: 


                               Vital Signs - 24 hr











  Temp Pulse Resp BP Pulse Ox


 


 12/21/21 16:00  97.7 F  66  19  121/58  94 L


 


 12/21/21 12:00  97.7 F  66  19  121/58  94 L


 


 12/21/21 08:00   73  12  130/72  94 L


 


 12/21/21 07:38   79  18   96


 


 12/21/21 04:00  98.6 F  84  18  149/78  93 L


 


 12/21/21 00:00  97.5 F  82  18  176/80  95


 


 12/20/21 20:00  97.5 F  79  20  167/70  98


 


 12/20/21 18:53   78  16   97








                        Pain Assessment - Last Documented











Pain Intensity                 6


 


Pain Scale Used                0-10 Pain Scale











Intake and Output: 


                                 Intake & Output











 12/19/21 12/20/21 12/21/21 12/22/21





 11:59 11:59 11:59 11:59


 


Intake Total  876 2413 


 


Output Total   50 


 


Balance  876 2363 


 


Weight  52 kg  











Lab Results: 


                            Lab Results-Last 24 Hours











  12/19/21 12/20/21 12/20/21 Range/Units





  18:00 09:15 22:56 


 


WBC     (4.0-10.5)  K/mm3


 


RBC     (4.1-5.4)  M/mm3


 


Hgb     (12.0-16.0)  gm/dl


 


Hct     (35-47)  %


 


MCV     ()  fl


 


MCH     (26-32)  pg


 


MCHC     (32-36)  g/dl


 


RDW     (11.5-14.0)  %


 


Plt Count     (150-450)  K/mm3


 


MPV     (7.5-11.0)  fl


 


Gran %     (36.0-66.0)  %


 


Eos # (Auto)     (0-0.5)  


 


Absolute Lymphs (auto)     (1.0-4.6)  


 


Absolute Monos (auto)     (0.0-1.3)  


 


Lymphocytes %     (24.0-44.0)  %


 


Monocytes %     (0.0-12.0)  %


 


Eosinophils %     (0.00-5.0)  %


 


Basophils %     (0.0-0.4)  %


 


Absolute Granulocytes     (1.4-6.9)  


 


Basophils #     (0-0.4)  


 


Sodium     (137-145)  mmol/L


 


Potassium     (3.5-5.1)  mmol/L


 


Chloride     ()  mmol/L


 


Carbon Dioxide     (22-30)  mmol/L


 


Anion Gap     (5-15)  MEQ/L


 


BUN     (7-17)  mg/dL


 


Creatinine     (0.52-1.04)  mg/dL


 


Estimated GFR     ML/MIN


 


Glucose     ()  mg/dL


 


POC Glucometer    176 H  (74 to 106)  mg/dL


 


Hemoglobin A1c     (4.5-6.0)  %


 


Calcium     (8.4-10.2)  mg/dL


 


Magnesium     (1.6-2.3)  mg/dL


 


C-Reactive Prot, Quant  4    (0-10)  mg/L


 


RPR   Non Reactive   (Non Reactive)  














  12/21/21 12/21/21 12/21/21 Range/Units





  07:00 07:00 08:59 


 


WBC  11.8 H    (4.0-10.5)  K/mm3


 


RBC  4.05 L    (4.1-5.4)  M/mm3


 


Hgb  11.4 L D    (12.0-16.0)  gm/dl


 


Hct  33.8 L    (35-47)  %


 


MCV  83.5    ()  fl


 


MCH  28.1    (26-32)  pg


 


MCHC  33.7    (32-36)  g/dl


 


RDW  14.4 H    (11.5-14.0)  %


 


Plt Count  232    (150-450)  K/mm3


 


MPV  9.6    (7.5-11.0)  fl


 


Gran %  66.8 H    (36.0-66.0)  %


 


Eos # (Auto)  0.14    (0-0.5)  


 


Absolute Lymphs (auto)  2.95    (1.0-4.6)  


 


Absolute Monos (auto)  0.80    (0.0-1.3)  


 


Lymphocytes %  25.0    (24.0-44.0)  %


 


Monocytes %  6.8    (0.0-12.0)  %


 


Eosinophils %  1.2    (0.00-5.0)  %


 


Basophils %  0.2    (0.0-0.4)  %


 


Absolute Granulocytes  7.88 H    (1.4-6.9)  


 


Basophils #  0.02    (0-0.4)  


 


Sodium   132 L   (137-145)  mmol/L


 


Potassium   3.8   (3.5-5.1)  mmol/L


 


Chloride   109 H   ()  mmol/L


 


Carbon Dioxide   18 L   (22-30)  mmol/L


 


Anion Gap   8.2   (5-15)  MEQ/L


 


BUN   14   (7-17)  mg/dL


 


Creatinine   0.73   (0.52-1.04)  mg/dL


 


Estimated GFR   > 60.0   ML/MIN


 


Glucose   102   ()  mg/dL


 


POC Glucometer    118 H  (74 to 106)  mg/dL


 


Hemoglobin A1c     (4.5-6.0)  %


 


Calcium   7.8 L   (8.4-10.2)  mg/dL


 


Magnesium   1.6   (1.6-2.3)  mg/dL


 


C-Reactive Prot, Quant     (0-10)  mg/L


 


RPR     (Non Reactive)  














  12/21/21 12/21/21 12/21/21 Range/Units





  12:04 13:30 16:43 


 


WBC     (4.0-10.5)  K/mm3


 


RBC     (4.1-5.4)  M/mm3


 


Hgb     (12.0-16.0)  gm/dl


 


Hct     (35-47)  %


 


MCV     ()  fl


 


MCH     (26-32)  pg


 


MCHC     (32-36)  g/dl


 


RDW     (11.5-14.0)  %


 


Plt Count     (150-450)  K/mm3


 


MPV     (7.5-11.0)  fl


 


Gran %     (36.0-66.0)  %


 


Eos # (Auto)     (0-0.5)  


 


Absolute Lymphs (auto)     (1.0-4.6)  


 


Absolute Monos (auto)     (0.0-1.3)  


 


Lymphocytes %     (24.0-44.0)  %


 


Monocytes %     (0.0-12.0)  %


 


Eosinophils %     (0.00-5.0)  %


 


Basophils %     (0.0-0.4)  %


 


Absolute Granulocytes     (1.4-6.9)  


 


Basophils #     (0-0.4)  


 


Sodium     (137-145)  mmol/L


 


Potassium     (3.5-5.1)  mmol/L


 


Chloride     ()  mmol/L


 


Carbon Dioxide     (22-30)  mmol/L


 


Anion Gap     (5-15)  MEQ/L


 


BUN     (7-17)  mg/dL


 


Creatinine     (0.52-1.04)  mg/dL


 


Estimated GFR     ML/MIN


 


Glucose     ()  mg/dL


 


POC Glucometer  216 H   215 H  (74 to 106)  mg/dL


 


Hemoglobin A1c   9.22 H   (4.5-6.0)  %


 


Calcium     (8.4-10.2)  mg/dL


 


Magnesium     (1.6-2.3)  mg/dL


 


C-Reactive Prot, Quant     (0-10)  mg/L


 


RPR     (Non Reactive)  











Radiology Exams: 


                              Radiology Procedures











 Category Date Time Status


 


 CHEST 1 VIEW (PORTABLE) Stat Exams  12/19/21 17:48 Completed


 


 HEAD WITHOUT CONTRAST [CT] Stat Exams  12/19/21 17:48 Completed


 


 MRA BRAIN WITHOUT CONTRAST [MRI] Routine Exams  12/20/21 08:35 Completed


 


 MRA NECK WITHOUT CONTRAST [MRI] Routine Exams  12/20/21 08:35 Completed


 


 MRI BRAIN W/O CONTRAST [MRI] Routine Exams  12/20/21 08:34 Completed











Multi-Disciplinary Progress Notes: 


                        Multi-Disciplinary Progress Notes





12/21/21 15:31 Case Management Note by Linda Barkley


ORDER FOR ROLLATOR SENT TO [facility] VIA [facility] AT THIS TIME





Initialized on 12/21/21 15:31 - END OF NOTE








12/21/21 10:33 Case Management Note by Linda Barkley


S/W SISTER THUY- SHE WAS INTERESTED IN REHAB PLACEMENT IN [facility] HOWEVER D/T P

ATIENT PRESUMPTIVE MEDICAID- LOCAL NH WILL NOT TAKE PATIENT. SHE WAS NOTIFIED 

THAT [facility] IN  WOULD BE OUR BEST OPTION FOR THIS REASON. SHE IDEALLY DID 

NOT WANT PATIENT OUT OF [place] AS SHE IS HAVING TROUBLE WITH TRANSPORTATION AT

 THIS TIME. SHE IS OPEN TO BRINGING PATIENT HOME AND CARE FOR HER 24/7 WITH [facility] 

SERVICES IF PATIENT IS ABLE TO MOVE A BIT TO BE ABLE TO ASSIST. PT TO EVAL 

PATIENT TODAY. SISTER ALSO NOTIFIED THAT WITH DEMENTIA DIAGNOSIS INSURANCE MAY 

NOT APPROVE A REHAB STAY ANYWAYS D/T DEMENTIA NOT BEING REHABABLE. SHE VERIFIED 

UNDERSTANDING





Initialized on 12/21/21 10:33 - END OF NOTE

















Assessment/Plan


(1) Altered mental status


Current Visit: Yes   Status: Acute   


Qualifiers: 


   Altered mental status type: delirium   Qualified Code(s): R41.0 - 

Disorientation, unspecified   


Assessment & Plan: 


Teleneurology consult reported dx of likely sundowning/delerium in a setting of 

mild cognitive impairment.   She is cooperative, just not entirely oriented 

today.


Code(s): R41.82 - ALTERED MENTAL STATUS, UNSPECIFIED   





(2) Hyponatremia


Current Visit: Yes   Status: Acute   


Assessment & Plan: 


improved to 132 from 129


Code(s): E87.1 - HYPO-OSMOLALITY AND HYPONATREMIA   





(3) Diarrhea


Current Visit: Yes   Status: Acute   


Qualifiers: 


   Diarrhea type: unspecified type   Qualified Code(s): R19.7 - Diarrhea, 

unspecified   


Assessment & Plan: 


Test for C. diff. Started today.


Code(s): R19.7 - DIARRHEA, UNSPECIFIED   





(4) Carotid stenosis, bilateral


Current Visit: Yes   Status: Chronic   Code(s): I65.23 - OCCLUSION AND STENOSIS 

OF BILATERAL CAROTID ARTERIES   





(5) H/O carotid endarterectomy


Current Visit: Yes   Status: Chronic   Code(s): Z98.890 - OTHER SPECIFIED 

POSTPROCEDURAL STATES   





(6) Type 2 diabetes mellitus


Current Visit: Yes   Status: Chronic   


Qualifiers: 


   Diabetes mellitus long term insulin use: without long term use

## 2021-12-22 LAB
ALBUMIN SERPL-MCNC: 2.7 G/DL (ref 3.5–5)
ALP SERPL-CCNC: 61 U/L (ref 38–126)
ALT SERPL-CCNC: 14 U/L (ref 0–35)
ANION GAP SERPL CALC-SCNC: 7.6 MEQ/L (ref 5–15)
AST SERPL QL: 25 U/L (ref 14–36)
BASOPHILS # BLD AUTO: 0.02 10*3/UL (ref 0–0.4)
BASOPHILS NFR BLD AUTO: 0.2 % (ref 0–0.4)
BILIRUB BLD-MCNC: 0.2 MG/DL (ref 0.2–1.3)
BUN SERPL-MCNC: 9 MG/DL (ref 7–17)
C DIFF DNA SPEC QL NAA+PROBE: (no result)
C DIFF TOX B STL QL: POSITIVE
CALCIUM SPEC-MCNC: 7.7 MG/DL (ref 8.4–10.2)
CHLORIDE SERPL-SCNC: 108 MMOL/L (ref 98–107)
CO2 SERPL-SCNC: 20 MMOL/L (ref 22–30)
CREAT SERPL-MCNC: 0.69 MG/DL (ref 0.52–1.04)
EOSINOPHIL # BLD AUTO: 0.14 10*3/UL (ref 0–0.5)
GFR SERPLBLD BASED ON 1.73 SQ M-ARVRAT: > 60 ML/MIN
GLUCOSE SERPL-MCNC: 155 MG/DL (ref 74–106)
HCT VFR BLD AUTO: 32.2 % (ref 35–47)
HGB BLD-MCNC: 10.8 GM/DL (ref 12–16)
LIPASE SERPL-CCNC: 274 U/L (ref 23–300)
LYMPHOCYTES # SPEC AUTO: 2.32 10*3/UL (ref 1–4.6)
MCH RBC QN AUTO: 28.6 PG (ref 26–32)
MCHC RBC AUTO-ENTMCNC: 33.5 G/DL (ref 32–36)
MONOCYTES # BLD AUTO: 0.77 10*3/UL (ref 0–1.3)
PLATELET # BLD AUTO: 209 K/MM3 (ref 150–450)
POTASSIUM SERPLBLD-SCNC: 3.6 MMOL/L (ref 3.5–5.1)
PROT SERPL-MCNC: 5.2 G/DL (ref 6.3–8.2)
RBC # BLD AUTO: 3.77 M/MM3 (ref 4.1–5.4)
SODIUM SERPL-SCNC: 132 MMOL/L (ref 137–145)
WBC # BLD AUTO: 12.9 K/MM3 (ref 4–10.5)

## 2021-12-22 RX ADMIN — PANTOPRAZOLE SODIUM SCH MG: 40 INJECTION, POWDER, FOR SOLUTION INTRAVENOUS at 09:34

## 2021-12-22 RX ADMIN — SUCRALFATE SCH G: 1 TABLET ORAL at 21:26

## 2021-12-22 RX ADMIN — SUCRALFATE SCH G: 1 TABLET ORAL at 17:04

## 2021-12-22 RX ADMIN — INSULIN GLARGINE SCH UNIT: 100 INJECTION, SOLUTION SUBCUTANEOUS at 21:27

## 2021-12-22 RX ADMIN — LEVOTHYROXINE SODIUM SCH MCG: 75 TABLET ORAL at 11:24

## 2021-12-22 RX ADMIN — HYDROCODONE BITARTRATE AND ACETAMINOPHEN PRN TAB: 5; 325 TABLET ORAL at 18:37

## 2021-12-22 RX ADMIN — HYDROCODONE BITARTRATE AND ACETAMINOPHEN PRN TAB: 5; 325 TABLET ORAL at 13:42

## 2021-12-22 RX ADMIN — HYDROCODONE BITARTRATE AND ACETAMINOPHEN PRN TAB: 5; 325 TABLET ORAL at 07:58

## 2021-12-22 RX ADMIN — INSULIN LISPRO PRN UNIT: 100 INJECTION, SOLUTION INTRAVENOUS; SUBCUTANEOUS at 17:05

## 2021-12-22 RX ADMIN — HYDROCHLOROTHIAZIDE SCH MG: 25 TABLET ORAL at 11:24

## 2021-12-22 RX ADMIN — ONDANSETRON PRN MG: 2 INJECTION, SOLUTION INTRAMUSCULAR; INTRAVENOUS at 17:06

## 2021-12-22 RX ADMIN — VANCOMYCIN HYDROCHLORIDE SCH MG: 125 CAPSULE ORAL at 17:56

## 2021-12-22 RX ADMIN — VANCOMYCIN HYDROCHLORIDE SCH MG: 125 CAPSULE ORAL at 23:32

## 2021-12-22 RX ADMIN — ONDANSETRON PRN MG: 2 INJECTION, SOLUTION INTRAMUSCULAR; INTRAVENOUS at 03:01

## 2021-12-22 RX ADMIN — INSULIN GLARGINE SCH: 100 INJECTION, SOLUTION SUBCUTANEOUS at 09:40

## 2021-12-22 RX ADMIN — VANCOMYCIN HYDROCHLORIDE SCH MG: 125 CAPSULE ORAL at 13:40

## 2021-12-22 RX ADMIN — ASPIRIN SCH MG: 81 TABLET, COATED ORAL at 11:24

## 2021-12-22 RX ADMIN — SIMVASTATIN SCH MG: 20 TABLET, FILM COATED ORAL at 21:27

## 2021-12-22 RX ADMIN — INSULIN LISPRO PRN UNIT: 100 INJECTION, SOLUTION INTRAVENOUS; SUBCUTANEOUS at 21:27

## 2021-12-22 RX ADMIN — SUCRALFATE SCH G: 1 TABLET ORAL at 09:36

## 2021-12-22 NOTE — PCM.NOTE
Date and Time: 12/22/21 0902





Subjective Assessment: 





patient is more alert and oriented today, had diarrhea all night and burning in 

the top of her stomach to her chest. 





Objective Exam


General Appearance: no apparent distress


Neurologic Exam: alert, oriented x 3, cooperative


Respiratory Exam: normal breath sounds, lungs clear, No respiratory distress


Cardiovascular Exam: regular rate/rhythm, normal heart sounds


Gastrointestinal/Abdomen Exam: soft, other (well healed midline)





OBJECTIVE DATA


Vital Signs: 


                               Vital Signs - 24 hr











  Temp Pulse Resp BP Pulse Ox


 


 12/22/21 07:46  98.3 F  82  16  143/76  96


 


 12/22/21 07:20   70  18   97


 


 12/22/21 04:00  97.9 F  72  18  164/77  97


 


 12/22/21 00:00  98.6 F  78  17  157/78  96


 


 12/21/21 20:00  97.9 F  72  19  163/74  100


 


 12/21/21 18:19   68  18   97


 


 12/21/21 16:00  97.8 F  70  16  130/70  99


 


 12/21/21 12:00  97.7 F  66  19  121/58  94 L








                        Pain Assessment - Last Documented











Pain Intensity                 10


 


Pain Scale Used                0-10 Pain Scale











Intake and Output: 


                                 Intake & Output











 12/19/21 12/20/21 12/21/21 12/22/21





 11:59 11:59 11:59 11:59


 


Intake Total  876 2413 3142


 


Output Total   50 


 


Balance  876 2363 3142


 


Weight  52 kg  











Lab Results: 


                            Lab Results-Last 24 Hours











  12/21/21 12/21/21 12/21/21 Range/Units





  07:00 12:04 13:30 


 


Sodium  132 L    (137-145)  mmol/L


 


Potassium  3.8    (3.5-5.1)  mmol/L


 


Chloride  109 H    ()  mmol/L


 


Carbon Dioxide  18 L    (22-30)  mmol/L


 


Anion Gap  8.2    (5-15)  MEQ/L


 


BUN  14    (7-17)  mg/dL


 


Creatinine  0.73    (0.52-1.04)  mg/dL


 


Estimated GFR  > 60.0    ML/MIN


 


Glucose  102    ()  mg/dL


 


POC Glucometer   216 H   (74 to 106)  mg/dL


 


Hemoglobin A1c    9.22 H  (4.5-6.0)  %


 


Calcium  7.8 L    (8.4-10.2)  mg/dL


 


Magnesium  1.6    (1.6-2.3)  mg/dL














  12/21/21 12/21/21 12/22/21 Range/Units





  16:43 22:00 06:34 


 


Sodium     (137-145)  mmol/L


 


Potassium     (3.5-5.1)  mmol/L


 


Chloride     ()  mmol/L


 


Carbon Dioxide     (22-30)  mmol/L


 


Anion Gap     (5-15)  MEQ/L


 


BUN     (7-17)  mg/dL


 


Creatinine     (0.52-1.04)  mg/dL


 


Estimated GFR     ML/MIN


 


Glucose     ()  mg/dL


 


POC Glucometer  215 H  201 H  119 H  (74 to 106)  mg/dL


 


Hemoglobin A1c     (4.5-6.0)  %


 


Calcium     (8.4-10.2)  mg/dL


 


Magnesium     (1.6-2.3)  mg/dL











Radiology Exams: 


                              Radiology Procedures











 Category Date Time Status


 


 MRA BRAIN WITHOUT CONTRAST [MRI] Routine Exams  12/20/21 08:35 Completed


 


 MRA NECK WITHOUT CONTRAST [MRI] Routine Exams  12/20/21 08:35 Completed


 


 MRI BRAIN W/O CONTRAST [MRI] Routine Exams  12/20/21 08:34 Completed











Multi-Disciplinary Progress Notes: 


                        Multi-Disciplinary Progress Notes





12/21/21 15:31 Case Management Note by Linda aBrkley


ORDER FOR ROLLATOR SENT TO [facility] VIA PARACUTE AT THIS TIME





Initialized on 12/21/21 15:31 - END OF NOTE








12/21/21 10:33 Case Management Note by Linda Barkley


S/W SISTER THUY- SHE WAS INTERESTED IN REHAB PLACEMENT IN [facility] HOWEVER D/T 

PATIENT PRESUMPTIVE MEDICAID- LOCAL NH WILL NOT TAKE PATIENT. SHE WAS NOTIFIED 

THAT [facility] IN  WOULD BE OUR BEST OPTION FOR THIS REASON. SHE IDEALLY DID 

NOT WANT PATIENT OUT OF [place] AS SHE IS HAVING TROUBLE WITH TRANSPORTATION AT

 THIS TIME. SHE IS OPEN TO BRINGING PATIENT HOME AND CARE FOR HER 24/7 WITH [facility] 

SERVICES IF PATIENT IS ABLE TO MOVE A BIT TO BE ABLE TO ASSIST. PT TO EVAL 

PATIENT TODAY. SISTER ALSO NOTIFIED THAT WITH DEMENTIA DIAGNOSIS INSURANCE MAY 

NOT APPROVE A REHAB STAY ANYWAYS D/T DEMENTIA NOT BEING REHABABLE. SHE VERIFIED 

UNDERSTANDING





Initialized on 12/21/21 10:33 - END OF NOTE

















Assessment/Plan


(1) Abdominal pain


Current Visit: Yes   Status: Acute   


Assessment & Plan: 


pain in epigastrium and diarrhea, c diff pending. labs ordered this am


Code(s): R10.9 - UNSPECIFIED ABDOMINAL PAIN   





(2) Altered mental status


Current Visit: Yes   Status: Acute   


Qualifiers: 


   Altered mental status type: delirium   Qualified Code(s): R41.0 - 

Disorientation, unspecified   


Assessment & Plan: 


likely secondary to underlying mild dementia and polysubstance use including 

marijuana, narcotic and benzo. nothing on neuro workup


Code(s): R41.82 - ALTERED MENTAL STATUS, UNSPECIFIED   





(3) Carotid stenosis, bilateral


Current Visit: Yes   Status: Chronic   Code(s): I65.23 - OCCLUSION AND STENOSIS 

OF BILATERAL CAROTID ARTERIES   





(4) H/O carotid endarterectomy


Current Visit: Yes   Status: Chronic   Code(s): Z98.890 - OTHER SPECIFIED 

POSTPROCEDURAL STATES   





(5) Type 2 diabetes mellitus


Current Visit: Yes   Status: Chronic   


Qualifiers: 


   Diabetes mellitus long term insulin use: without long term use

## 2021-12-23 VITALS — SYSTOLIC BLOOD PRESSURE: 151 MMHG | HEART RATE: 70 BPM | OXYGEN SATURATION: 100 % | DIASTOLIC BLOOD PRESSURE: 67 MMHG

## 2021-12-23 LAB
ALBUMIN SERPL-MCNC: 3 G/DL (ref 3.5–5)
ALP SERPL-CCNC: 67 U/L (ref 38–126)
ALT SERPL-CCNC: 13 U/L (ref 0–35)
ANION GAP SERPL CALC-SCNC: 9.1 MEQ/L (ref 5–15)
AST SERPL QL: 16 U/L (ref 14–36)
BASOPHILS # BLD AUTO: 0.02 10*3/UL (ref 0–0.4)
BASOPHILS NFR BLD AUTO: 0.2 % (ref 0–0.4)
BILIRUB BLD-MCNC: 0.2 MG/DL (ref 0.2–1.3)
BUN SERPL-MCNC: 8 MG/DL (ref 7–17)
CALCIUM SPEC-MCNC: 8.4 MG/DL (ref 8.4–10.2)
CHLORIDE SERPL-SCNC: 111 MMOL/L (ref 98–107)
CO2 SERPL-SCNC: 18 MMOL/L (ref 22–30)
CREAT SERPL-MCNC: 0.73 MG/DL (ref 0.52–1.04)
EOSINOPHIL # BLD AUTO: 0.22 10*3/UL (ref 0–0.5)
GFR SERPLBLD BASED ON 1.73 SQ M-ARVRAT: > 60 ML/MIN
GLUCOSE SERPL-MCNC: 117 MG/DL (ref 74–106)
HCT VFR BLD AUTO: 34.6 % (ref 35–47)
HGB BLD-MCNC: 11.5 GM/DL (ref 12–16)
LYMPHOCYTES # SPEC AUTO: 3.09 10*3/UL (ref 1–4.6)
MAGNESIUM SERPL-MCNC: 1.7 MG/DL (ref 1.6–2.3)
MCH RBC QN AUTO: 28.7 PG (ref 26–32)
MCHC RBC AUTO-ENTMCNC: 33.2 G/DL (ref 32–36)
MONOCYTES # BLD AUTO: 0.64 10*3/UL (ref 0–1.3)
PLATELET # BLD AUTO: 236 K/MM3 (ref 150–450)
POTASSIUM SERPLBLD-SCNC: 3.7 MMOL/L (ref 3.5–5.1)
PROT SERPL-MCNC: 5.7 G/DL (ref 6.3–8.2)
RBC # BLD AUTO: 4.01 M/MM3 (ref 4.1–5.4)
SODIUM SERPL-SCNC: 135 MMOL/L (ref 137–145)
WBC # BLD AUTO: 12.3 K/MM3 (ref 4–10.5)

## 2021-12-23 RX ADMIN — PANTOPRAZOLE SODIUM SCH MG: 40 INJECTION, POWDER, FOR SOLUTION INTRAVENOUS at 09:11

## 2021-12-23 RX ADMIN — INSULIN LISPRO PRN UNIT: 100 INJECTION, SOLUTION INTRAVENOUS; SUBCUTANEOUS at 13:35

## 2021-12-23 RX ADMIN — VANCOMYCIN HYDROCHLORIDE SCH MG: 125 CAPSULE ORAL at 12:37

## 2021-12-23 RX ADMIN — SUCRALFATE SCH G: 1 TABLET ORAL at 09:10

## 2021-12-23 RX ADMIN — VANCOMYCIN HYDROCHLORIDE SCH MG: 125 CAPSULE ORAL at 06:13

## 2021-12-23 RX ADMIN — LEVOTHYROXINE SODIUM SCH MCG: 75 TABLET ORAL at 09:10

## 2021-12-23 RX ADMIN — INSULIN GLARGINE SCH UNIT: 100 INJECTION, SOLUTION SUBCUTANEOUS at 09:11

## 2021-12-23 RX ADMIN — HYDROCHLOROTHIAZIDE SCH MG: 25 TABLET ORAL at 09:10

## 2021-12-23 RX ADMIN — SUCRALFATE SCH G: 1 TABLET ORAL at 12:37

## 2021-12-23 RX ADMIN — HYDROCODONE BITARTRATE AND ACETAMINOPHEN PRN TAB: 5; 325 TABLET ORAL at 11:27

## 2021-12-23 RX ADMIN — ASPIRIN SCH MG: 81 TABLET, COATED ORAL at 09:10

## 2021-12-23 RX ADMIN — HYDROCODONE BITARTRATE AND ACETAMINOPHEN PRN TAB: 5; 325 TABLET ORAL at 04:33

## 2021-12-23 NOTE — PCM.DS
Discharge Summary


Date of Admission: 


12/19/21 23:26





Admitting Physician: 


RONAL DOMINGUEZ





Consults: 





                                Consults on Case





12/20/21 08:36


Consult Neurology ROUTINE 











Primary Care Provider: 


JAMEY COVARRUBIAS








Allergies


Allergies





Cephalosporins Allergy (Intermediate, Verified 12/19/21 18:10)


   


Tetracyclines Allergy (Intermediate, Verified 12/19/21 18:10)


   


avalox Allergy (Intermediate, Uncoded 12/19/21 18:10)


   











Hospital Summary





- Hospital Course


Hospital Course: 





patient was admitted with altered mental status, on norco and xanax at home and 

admitted to thc use. had CEA left carotid 3 months ago, MRI/MRA brain and neck 

showed no significant vascular issues and no stroke. her EEG was unremarkable. 

suspect underlying dementia with delirium/acute exacerbation. she is doing much 

better, developed some abd pain and doing better, diarrhea improved on vanc c 

diff organism present on stool sample. has been treated for multiple UTIs in the

past few months.





- Vitals & Intake/Output


Vital Signs: 





                                   Vital Signs











Temperature  97.1 F   12/23/21 08:00


 


Pulse Rate  72   12/23/21 08:08


 


Respiratory Rate  18   12/23/21 08:08


 


Blood Pressure  146/65   12/23/21 08:00


 


O2 Sat by Pulse Oximetry  97   12/23/21 08:08











Intake & Output: 





                                 Intake & Output











 12/20/21 12/21/21 12/22/21 12/23/21





 11:59 11:59 11:59 11:59


 


Intake Total 876 2413 3382 2991


 


Output Total  50  600


 


Balance 876 2363 3382 2391


 


Weight 52 kg   














- Lab


Result Diagrams: 


                                 12/23/21 05:00





                                 12/23/21 04:50


Lab Results-Last 24 Hrs: 





                            Lab Results-Last 24 Hours











  12/22/21 12/22/21 12/22/21 Range/Units





  08:53 10:02 10:02 


 


WBC   12.9 H   (4.0-10.5)  K/mm3


 


RBC   3.77 L   (4.1-5.4)  M/mm3


 


Hgb   10.8 L   (12.0-16.0)  gm/dl


 


Hct   32.2 L   (35-47)  %


 


MCV   85.4   ()  fl


 


MCH   28.6   (26-32)  pg


 


MCHC   33.5   (32-36)  g/dl


 


RDW   14.5 H   (11.5-14.0)  %


 


Plt Count   209   (150-450)  K/mm3


 


MPV   9.2   (7.5-11.0)  fl


 


Gran %   74.7 H   (36.0-66.0)  %


 


Eos # (Auto)   0.14   (0-0.5)  


 


Absolute Lymphs (auto)   2.32   (1.0-4.6)  


 


Absolute Monos (auto)   0.77   (0.0-1.3)  


 


Lymphocytes %   18.0 L   (24.0-44.0)  %


 


Monocytes %   6.0   (0.0-12.0)  %


 


Eosinophils %   1.1   (0.00-5.0)  %


 


Basophils %   0.2   (0.0-0.4)  %


 


Absolute Granulocytes   9.64 H   (1.4-6.9)  


 


Basophils #   0.02   (0-0.4)  


 


Sodium    132 L  (137-145)  mmol/L


 


Potassium    3.6  (3.5-5.1)  mmol/L


 


Chloride    108 H  ()  mmol/L


 


Carbon Dioxide    20 L  (22-30)  mmol/L


 


Anion Gap    7.6  (5-15)  MEQ/L


 


BUN    9  (7-17)  mg/dL


 


Creatinine    0.69  (0.52-1.04)  mg/dL


 


Estimated GFR    > 60.0  ML/MIN


 


Glucose    155 H  ()  mg/dL


 


POC Glucometer     (74 to 106)  mg/dL


 


Calcium    7.7 L  (8.4-10.2)  mg/dL


 


Magnesium     (1.6-2.3)  mg/dL


 


Total Bilirubin    0.20  (0.2-1.3)  mg/dL


 


AST    25  (14-36)  U/L


 


ALT    14  (0-35)  U/L


 


Alkaline Phosphatase    61  ()  U/L


 


Serum Total Protein    5.2 L  (6.3-8.2)  g/dL


 


Albumin    2.7 L  (3.5-5.0)  g/dL


 


Lipase    274  ()  U/L


 


C. difficile Screen  POSITIVE    (NEGATIVE)  


 


C.difficile 027-NAP1-B1  PRESUMPTIVE NEGATIVE    (NEGATIVE)  














  12/22/21 12/22/21 12/22/21 Range/Units





  11:43 16:15 20:53 


 


WBC     (4.0-10.5)  K/mm3


 


RBC     (4.1-5.4)  M/mm3


 


Hgb     (12.0-16.0)  gm/dl


 


Hct     (35-47)  %


 


MCV     ()  fl


 


MCH     (26-32)  pg


 


MCHC     (32-36)  g/dl


 


RDW     (11.5-14.0)  %


 


Plt Count     (150-450)  K/mm3


 


MPV     (7.5-11.0)  fl


 


Gran %     (36.0-66.0)  %


 


Eos # (Auto)     (0-0.5)  


 


Absolute Lymphs (auto)     (1.0-4.6)  


 


Absolute Monos (auto)     (0.0-1.3)  


 


Lymphocytes %     (24.0-44.0)  %


 


Monocytes %     (0.0-12.0)  %


 


Eosinophils %     (0.00-5.0)  %


 


Basophils %     (0.0-0.4)  %


 


Absolute Granulocytes     (1.4-6.9)  


 


Basophils #     (0-0.4)  


 


Sodium     (137-145)  mmol/L


 


Potassium     (3.5-5.1)  mmol/L


 


Chloride     ()  mmol/L


 


Carbon Dioxide     (22-30)  mmol/L


 


Anion Gap     (5-15)  MEQ/L


 


BUN     (7-17)  mg/dL


 


Creatinine     (0.52-1.04)  mg/dL


 


Estimated GFR     ML/MIN


 


Glucose     ()  mg/dL


 


POC Glucometer  143 H  278 H  201 H  (74 to 106)  mg/dL


 


Calcium     (8.4-10.2)  mg/dL


 


Magnesium     (1.6-2.3)  mg/dL


 


Total Bilirubin     (0.2-1.3)  mg/dL


 


AST     (14-36)  U/L


 


ALT     (0-35)  U/L


 


Alkaline Phosphatase     ()  U/L


 


Serum Total Protein     (6.3-8.2)  g/dL


 


Albumin     (3.5-5.0)  g/dL


 


Lipase     ()  U/L


 


C. difficile Screen     (NEGATIVE)  


 


C.difficile 027-NAP1-B1     (NEGATIVE)  














  12/23/21 12/23/21 12/23/21 Range/Units





  04:50 05:00 07:33 


 


WBC   12.3 H   (4.0-10.5)  K/mm3


 


RBC   4.01 L   (4.1-5.4)  M/mm3


 


Hgb   11.5 L   (12.0-16.0)  gm/dl


 


Hct   34.6 L   (35-47)  %


 


MCV   86.3   ()  fl


 


MCH   28.7   (26-32)  pg


 


MCHC   33.2   (32-36)  g/dl


 


RDW   14.6 H   (11.5-14.0)  %


 


Plt Count   236   (150-450)  K/mm3


 


MPV   10.1   (7.5-11.0)  fl


 


Gran %   67.8 H   (36.0-66.0)  %


 


Eos # (Auto)   0.22   (0-0.5)  


 


Absolute Lymphs (auto)   3.09   (1.0-4.6)  


 


Absolute Monos (auto)   0.64   (0.0-1.3)  


 


Lymphocytes %   25.0   (24.0-44.0)  %


 


Monocytes %   5.2   (0.0-12.0)  %


 


Eosinophils %   1.8   (0.00-5.0)  %


 


Basophils %   0.2   (0.0-0.4)  %


 


Absolute Granulocytes   8.37 H   (1.4-6.9)  


 


Basophils #   0.02   (0-0.4)  


 


Sodium  135 L    (137-145)  mmol/L


 


Potassium  3.7    (3.5-5.1)  mmol/L


 


Chloride  111 H    ()  mmol/L


 


Carbon Dioxide  18 L    (22-30)  mmol/L


 


Anion Gap  9.1    (5-15)  MEQ/L


 


BUN  8    (7-17)  mg/dL


 


Creatinine  0.73    (0.52-1.04)  mg/dL


 


Estimated GFR  > 60.0    ML/MIN


 


Glucose  117 H    ()  mg/dL


 


POC Glucometer    125 H  (74 to 106)  mg/dL


 


Calcium  8.4    (8.4-10.2)  mg/dL


 


Magnesium  1.7    (1.6-2.3)  mg/dL


 


Total Bilirubin  0.20    (0.2-1.3)  mg/dL


 


AST  16    (14-36)  U/L


 


ALT  13    (0-35)  U/L


 


Alkaline Phosphatase  67    ()  U/L


 


Serum Total Protein  5.7 L    (6.3-8.2)  g/dL


 


Albumin  3.0 L    (3.5-5.0)  g/dL


 


Lipase     ()  U/L


 


C. difficile Screen     (NEGATIVE)  


 


C.difficile [id number]     (NEGATIVE)  











Micro Results-Entire Visit: 





                                  Microbiology











 12/19/21 18:20 Blood Culture - Preliminary





 Blood    NO GROWTH TO DATE


 


 12/19/21 18:04 Blood Culture - Preliminary





 Blood    NO GROWTH TO DATE


 


 12/19/21 18:04 Urine Culture - Final





 Urine, Catheterized    NO GROWTH








                                   Accuchecks











Date                           12/23/21


 


Time                           07:35

















- Procedures and Test


Procedures and Tests throughout Hospitalization: 





                            Therapy Orders & Screens





12/20/21 08:36


EEG 41-60 Minutes (Normal) ONCE 


   Comment: 


   Reason For Exam: 


   Diagnosis: altered mental status





12/21/21 07:00


Respiratory Therapy Assessment DAILY 


   Comment: 


   Diagnosis: altered mental status





12/21/21 08:44


PT Eval & Treat (MD Order) ONCE 


   Reason for Eval:: pt weak - may not be ready for treatment yet


   Diagnosis: altered mental status














Discharge Exam


General Appearance: no apparent distress


Neurologic Exam: alert, oriented x 3, cooperative


Respiratory Exam: normal breath sounds, lungs clear, No respiratory distress


Cardiovascular Exam: regular rate/rhythm, normal heart sounds


Gastrointestinal/Abdomen Exam: soft, No tenderness, No mass


Skin Exam: normal color, warm, dry





Final Diagnosis/Problem List





- Final Discharge Diagnosis/Problem


(1) C. difficile diarrhea


Current Visit: Yes   Status: Acute   


Assessment & Plan: 


home on po vanc


Code(s): A04.72 - ENTEROCOLITIS D/T CLOSTRIDIUM DIFFICILE, NOT SPCF AS RECUR   





(2) Abdominal pain


Current Visit: Yes   Status: Acute   Code(s): R10.9 - UNSPECIFIED ABDOMINAL PAIN

  





(3) Altered mental status


Current Visit: Yes   Status: Acute   Code(s): R41.82 - ALTERED MENTAL STATUS, 

UNSPECIFIED   





(4) Carotid stenosis, bilateral


Current Visit: Yes   Status: Chronic   Code(s): I65.23 - OCCLUSION AND STENOSIS 

OF BILATERAL CAROTID ARTERIES   





(5) H/O carotid endarterectomy


Current Visit: Yes   Status: Chronic   Code(s): Z98.890 - OTHER SPECIFIED PO

STPROCEDURAL STATES   





(6) Type 2 diabetes mellitus


Current Visit: Yes   Status: Chronic   





- Discharge


Disposition: Home, Self-Care


Condition: Good


Prescriptions: 


New


   PANTOPRAZOLE 40 mg Tablet*** [Protonix 40MG Tablet***] 40 mg PO QPM #30 tab


   Vancomycin HCl [Vancomycin HCl Capsule] 125 mg PO Q6HT #36 cap





Continue


   Metformin HCl [Metformin ER Gastric] 1,000 mg PO BID


   Atorvastatin Calcium [Lipitor 40Mg] 20 mg PO DAILY


   Lisinopril 5 mg*** [Zestril 5 MG***] 10 mg PO DAILY


   Iron 325 mg PO BID


   ondansetron HCL [Zofran***] 4 mg PO Q6H PRN #10 tablet


     PRN Reason: Nausea/Vomiting


   Nicotine [Nicotine Patch] 21 mg TD DAILY


   Mirtazapine 30 mg*** [Remeron 30 mg***] 45 mg PO HS


   Levothyroxine Sodium 50 Mcg*** [Synthroid 50 Mcg***] 75 mcg PO DAILY


   Insulin Lispro [Admelog] 100 unit SQ ACHS


   Insulin Glargine [Lantus Insulin] 20 units SQ BID


   Furosemide 40 mg*** [Lasix 40 MG***] 60 mg PO DAILY


   Fluticasone Furoate [Arnuity Ellipta] 100 mcg IH DAILY


   Calcium Carb/Vitamin D 500 mg* [Calcium 500MG W/Vit D Tablet**] 1 tab PO BID


   Buspirone HCl 15 mg PO BID


   Aspirin [Adult Low Dose Aspirin EC] 81 mg PO DAILY


   Albuterol Sulfate [Albuterol Sulfate Hfa] 18 gm IH Q6H PRN PRN


     PRN Reason: Shortness Of Breath/Wheezing


   Nitroglycerin 0.4 mg Tablet*** [Nitrostat 0.4 MG Tablet***] 0.4 mg SL Q5MIN 

PRN MR X 3 PRN


     PRN Reason: Chest Pain


   Omeprazole 20 mg PO DAILY


   Tizanidine HCl 4 mg** [Zanaflex 4 MG**] 4 mg PO HS


Follow up with: 


JAMEY COVARRUBIAS [Primary Care Provider] - 1 Week